# Patient Record
Sex: MALE | Race: WHITE | Employment: FULL TIME | URBAN - METROPOLITAN AREA
[De-identification: names, ages, dates, MRNs, and addresses within clinical notes are randomized per-mention and may not be internally consistent; named-entity substitution may affect disease eponyms.]

---

## 2020-01-28 ENCOUNTER — OFFICE VISIT (OUTPATIENT)
Dept: FAMILY MEDICINE CLINIC | Age: 25
End: 2020-01-28
Payer: COMMERCIAL

## 2020-01-28 VITALS — DIASTOLIC BLOOD PRESSURE: 75 MMHG | HEART RATE: 84 BPM | SYSTOLIC BLOOD PRESSURE: 139 MMHG

## 2020-01-28 PROCEDURE — 99213 OFFICE O/P EST LOW 20 MIN: CPT | Performed by: PHYSICIAN ASSISTANT

## 2020-01-28 RX ORDER — AMOXICILLIN 875 MG/1
875 TABLET, COATED ORAL 2 TIMES DAILY
Qty: 20 TABLET | Refills: 0 | Status: SHIPPED | OUTPATIENT
Start: 2020-01-28 | End: 2020-02-17

## 2020-01-28 RX ORDER — PREDNISONE 20 MG/1
20 TABLET ORAL 2 TIMES DAILY
Qty: 10 TABLET | Refills: 0 | Status: SHIPPED | OUTPATIENT
Start: 2020-01-28 | End: 2020-02-02

## 2020-01-28 ASSESSMENT — ENCOUNTER SYMPTOMS
GASTROINTESTINAL NEGATIVE: 1
SINUS PAIN: 1
RHINORRHEA: 0
COUGH: 1
SHORTNESS OF BREATH: 0
SINUS PRESSURE: 1
EYES NEGATIVE: 1
HOARSE VOICE: 0
SWOLLEN GLANDS: 0
SORE THROAT: 1

## 2020-01-28 NOTE — PROGRESS NOTES
the remainder of the review of systems was reviewed andnegative. PAST MEDICAL HISTORY   History reviewed. No past medical history on file. SURGICAL HISTORY     History reviewed. No past surgical history on file. CURRENT MEDICATIONS       Current Outpatient Medications   Medication Sig Dispense Refill    amoxicillin (AMOXIL) 875 MG tablet Take 1 tablet by mouth 2 times daily 20 tablet 0    predniSONE (DELTASONE) 20 MG tablet Take 1 tablet by mouth 2 times daily for 5 days 10 tablet 0    fluticasone (FLONASE) 50 MCG/ACT nasal spray 1 spray by Nasal route daily 1 Bottle 0    pseudoephedrine (DECONGESTANT) 30 MG tablet Take 1 tablet by mouth every 6 hours as needed for Congestion 40 tablet 0     No current facility-administered medications for this visit. ALLERGIES     Cefdinir    FAMILY HISTORY     No family history on file. No family status information on file. SOCIAL HISTORY      reports that he has never smoked. He has never used smokeless tobacco.      PHYSICAL EXAM    (up to 7 for level 4, 8 or more for level 5)     Vitals:    01/28/20 1017   BP: 139/75   Site: Left Upper Arm   Position: Sitting   Cuff Size: Medium Adult   Pulse: 84         Physical Exam  Vitals signs and nursing note reviewed. Constitutional:       General: He is not in acute distress. Appearance: Normal appearance. He is not ill-appearing, toxic-appearing or diaphoretic. HENT:      Head: Normocephalic and atraumatic. Right Ear: External ear normal.      Left Ear: External ear normal.      Nose: Congestion present. Mouth/Throat:      Mouth: Mucous membranes are moist.   Eyes:      Extraocular Movements: Extraocular movements intact. Conjunctiva/sclera: Conjunctivae normal.   Cardiovascular:      Rate and Rhythm: Normal rate and regular rhythm. Pulmonary:      Effort: Pulmonary effort is normal.      Breath sounds: Normal breath sounds. Abdominal:      Palpations: Abdomen is soft.

## 2020-01-31 ENCOUNTER — OFFICE VISIT (OUTPATIENT)
Dept: FAMILY MEDICINE CLINIC | Age: 25
End: 2020-01-31
Payer: COMMERCIAL

## 2020-01-31 VITALS
TEMPERATURE: 98.3 F | BODY MASS INDEX: 28 KG/M2 | HEART RATE: 62 BPM | RESPIRATION RATE: 16 BRPM | DIASTOLIC BLOOD PRESSURE: 61 MMHG | WEIGHT: 200 LBS | HEIGHT: 71 IN | SYSTOLIC BLOOD PRESSURE: 99 MMHG

## 2020-01-31 PROCEDURE — 99213 OFFICE O/P EST LOW 20 MIN: CPT | Performed by: NURSE PRACTITIONER

## 2020-01-31 RX ORDER — FLUTICASONE PROPIONATE 50 MCG
1 SPRAY, SUSPENSION (ML) NASAL DAILY
Qty: 1 BOTTLE | Refills: 0 | Status: SHIPPED | OUTPATIENT
Start: 2020-01-31 | End: 2020-03-19 | Stop reason: SINTOL

## 2020-01-31 RX ORDER — PSEUDOEPHEDRINE HYDROCHLORIDE 30 MG/1
30 TABLET ORAL EVERY 6 HOURS PRN
Qty: 40 TABLET | Refills: 0 | Status: SHIPPED | OUTPATIENT
Start: 2020-01-31 | End: 2020-02-10

## 2020-01-31 ASSESSMENT — ENCOUNTER SYMPTOMS: ALLERGIC REACTION: 1

## 2020-01-31 NOTE — PROGRESS NOTES
[address] WALK-IN  [facility] [address] [place]  Dept: [phone]  Dept Fax: [phone]    Cornell [place] is a 30 y.o. male who presents today forhis medical conditions/complaints as noted below. Cornell [place] is c/o of   Chief Complaint   Patient presents with    Other     numbness in mouth, neck was swelling      HPI:     Allergic Reaction   This is a new problem. The current episode started today. The problem occurs constantly. Pertinent negatives include no abdominal pain, chest pain, coughing, diarrhea, eye itching, rash or vomiting. Sinusitis   This is a new problem. The current episode started 1 to 4 weeks ago. The problem is unchanged. Associated symptoms include ear pain and a sore throat. Pertinent negatives include no chills, congestion, coughing, headaches, neck pain or shortness of breath. (Ear fullness. Jaw and neck pain ) Past treatments include oral decongestants. The treatment provided mild relief.   sore throat, swollen neck, productive sputum- ABX. Pins and needles feeling on L side. Glands very stiff- have since subsided. History reviewed. No pertinent past medical history. History reviewed. No pertinent surgical history. History reviewed. No pertinent family history. Social History     Tobacco Use    Smoking status: Never Smoker    Smokeless tobacco: Never Used   Substance Use Topics    Alcohol use: Not on file      Current Outpatient Medications   Medication Sig Dispense Refill    fluticasone (FLONASE) 50 MCG/ACT nasal spray 1 spray by Nasal route daily 1 Bottle 0    pseudoephedrine (DECONGESTANT) 30 MG tablet Take 1 tablet by mouth every 6 hours as needed for Congestion 40 tablet 0    amoxicillin (AMOXIL) 875 MG tablet Take 1 tablet by mouth 2 times daily 20 tablet 0     No current facility-administered medications for this visit.          Allergies   Allergen Reactions    Cefdinir Rash Subjective:      Review of Systems   Constitutional: Negative for appetite change, chills, fatigue and fever. HENT: Positive for ear pain, postnasal drip and sore throat. Negative for congestion. L sided neck and jaw pain    Eyes: Negative. Negative for discharge and itching. Respiratory: Negative for cough, chest tightness and shortness of breath. Cardiovascular: Negative for chest pain, palpitations and leg swelling. Gastrointestinal: Negative for abdominal pain, diarrhea, nausea and vomiting. Endocrine: Negative. Genitourinary: Negative for dysuria, frequency and urgency. Musculoskeletal: Negative for neck pain and neck stiffness. Skin: Negative for rash. Allergic/Immunologic: Negative. Neurological: Negative for dizziness, weakness, light-headedness and headaches. Hematological: Negative for adenopathy. Psychiatric/Behavioral: Negative for confusion. Objective:      Physical Exam  Vitals signs reviewed. Constitutional:       Appearance: He is well-developed. HENT:      Head: Normocephalic. Right Ear: External ear normal. A middle ear effusion is present. Left Ear: External ear normal. There is impacted cerumen. Ears:      Comments: Cerumen impaction noted to L ear- patient declines irrigation at this time      Nose: Nose normal.   Eyes:      Conjunctiva/sclera: Conjunctivae normal.      Pupils: Pupils are equal, round, and reactive to light. Neck:      Musculoskeletal: Normal range of motion and neck supple. Cardiovascular:      Rate and Rhythm: Normal rate and regular rhythm. Heart sounds: Normal heart sounds, S1 normal and S2 normal. No murmur. No friction rub. No gallop. Pulmonary:      Effort: Pulmonary effort is normal. No respiratory distress. Breath sounds: Normal breath sounds. No stridor, decreased air movement or transmitted upper airway sounds. No decreased breath sounds, wheezing, rhonchi or rales.    Abdominal: General: Bowel sounds are normal.      Palpations: Abdomen is soft. Musculoskeletal: Normal range of motion. Lymphadenopathy:      Cervical: No cervical adenopathy. Skin:     General: Skin is warm and dry. Findings: No rash. Neurological:      Mental Status: He is alert and oriented to person, place, and time. Cranial Nerves: No cranial nerve deficit. Coordination: Coordination normal.      Deep Tendon Reflexes: Reflexes are normal and symmetric. Psychiatric:         Thought Content: Thought content normal.       BP 99/61   Pulse 62   Temp 98.3 °F (36.8 °C) (Temporal)   Resp 16   Ht 5' 11\" (1.803 m)   Wt 200 lb (90.7 kg)   BMI 27.89 kg/m²     Assessment:       Diagnosis Orders   1. Fluid level behind tympanic membrane of both ears  fluticasone (FLONASE) 50 MCG/ACT nasal spray    pseudoephedrine (DECONGESTANT) 30 MG tablet   2. Impacted cerumen of left ear  fluticasone (FLONASE) 50 MCG/ACT nasal spray    pseudoephedrine (DECONGESTANT) 30 MG tablet             Plan:     1.) Ear irrigation encouraged- declined at this time   2.) Flonase PRN   3.) Decongestant PRN   4.) RTO PRN   5.) Follow-up with PCP     Problem List     None           Patient given educationalmaterials - see patient instructions. Discussed use, benefit, and side effectsof prescribed medications. All patient questions answered. Pt verbalized understanding. Instructed to continue current medications, diet and exercise. Patient agreedwith treatment plan. Follow up as directed.      Electronically signed by COLTON San CNP on 2/5/2020 at 7:07 PM

## 2020-02-05 ASSESSMENT — ENCOUNTER SYMPTOMS
SORE THROAT: 1
COUGH: 0
SHORTNESS OF BREATH: 0
ALLERGIC/IMMUNOLOGIC NEGATIVE: 1
EYE ITCHING: 0
DIARRHEA: 0
EYES NEGATIVE: 1
ABDOMINAL PAIN: 0
VOMITING: 0
CHEST TIGHTNESS: 0
EYE DISCHARGE: 0
NAUSEA: 0

## 2020-02-17 ENCOUNTER — OFFICE VISIT (OUTPATIENT)
Dept: FAMILY MEDICINE CLINIC | Age: 25
End: 2020-02-17
Payer: COMMERCIAL

## 2020-02-17 VITALS
WEIGHT: 196.8 LBS | HEIGHT: 71 IN | OXYGEN SATURATION: 97 % | BODY MASS INDEX: 27.55 KG/M2 | DIASTOLIC BLOOD PRESSURE: 70 MMHG | TEMPERATURE: 97 F | HEART RATE: 97 BPM | SYSTOLIC BLOOD PRESSURE: 118 MMHG

## 2020-02-17 PROBLEM — E66.3 OVERWEIGHT: Status: ACTIVE | Noted: 2020-02-17

## 2020-02-17 PROCEDURE — 99213 OFFICE O/P EST LOW 20 MIN: CPT | Performed by: FAMILY MEDICINE

## 2020-02-17 RX ORDER — OMEPRAZOLE 20 MG/1
20 CAPSULE, DELAYED RELEASE ORAL DAILY
COMMUNITY

## 2020-02-17 ASSESSMENT — ENCOUNTER SYMPTOMS
VOICE CHANGE: 1
SHORTNESS OF BREATH: 0
GASTROINTESTINAL NEGATIVE: 1
COUGH: 1
SINUS PAIN: 1
WHEEZING: 1
TROUBLE SWALLOWING: 0
SORE THROAT: 1
RHINORRHEA: 1
SINUS PRESSURE: 1
EYES NEGATIVE: 1
FACIAL SWELLING: 0

## 2020-02-17 ASSESSMENT — PATIENT HEALTH QUESTIONNAIRE - PHQ9
SUM OF ALL RESPONSES TO PHQ9 QUESTIONS 1 & 2: 0
SUM OF ALL RESPONSES TO PHQ QUESTIONS 1-9: 0
SUM OF ALL RESPONSES TO PHQ QUESTIONS 1-9: 0
2. FEELING DOWN, DEPRESSED OR HOPELESS: 0
1. LITTLE INTEREST OR PLEASURE IN DOING THINGS: 0

## 2020-02-17 NOTE — PROGRESS NOTES
[address] CARE  [address] [place] [address] [place] [id number]  Dept: [phone]  Dept Fax: [phone]    zIabella Joyner is a 30 y.o. male who presents today for his medical conditions/complaints as noted below. Izabella Joyner is c/o of   Chief Complaint   Patient presents with    \[facility]\"" Care    Gastroesophageal Reflux         HPI:     The patient states that he has had GERD. He states 12/2019 he developed a lump in his throat, hacking up phlegm. He denies any heartburn/indigestion. He states he does intermittently have a cough productive of phelgm. The patient states he has been taking omeprazole. The patient does a chronic h/o post nasal drainage. The patient has taken zyrtec in the past but didn't believe it to be working at all. No results found for: LABA1C          ( goal A1Cis < 7)   No results found for: LABMICR  No results found for: LDLCHOLESTEROL, LDLCALC    (goal LDL is <100)   No results found for: AST, ALT, BUN  BP Readings from Last 3 Encounters:   02/17/20 118/70   01/31/20 99/61   01/28/20 139/75          (goal 120/80)    Past Medical History:   Diagnosis Date    Allergic rhinitis     GERD (gastroesophageal reflux disease) 2019      History reviewed. No pertinent surgical history. Family History   Problem Relation Age of Onset    No Known Problems Mother     No Known Problems Father     Psoriasis Sister     Hypertension Maternal Grandmother     Diabetes type 2  Maternal Grandfather     Kidney Disease Maternal Grandfather         passed away at 71years of age   [facility] Stroke Maternal Grandfather     No Known Problems Paternal Grandmother     No Known Problems Paternal Grandfather        Social History     Tobacco Use    Smoking status: Never Smoker    Smokeless tobacco: Never Used   Substance Use Topics    Alcohol use:  Yes     Alcohol/week: 1.0 standard drinks     Types: 1 Cans of beer per week     Comment: 1 beer every 3-5 weeks      Current Outpatient Medications   Medication Sig Dispense Refill    omeprazole (PRILOSEC) 20 MG delayed release capsule Take 20 mg by mouth daily      fluticasone (FLONASE) 50 MCG/ACT nasal spray 1 spray by Nasal route daily 1 Bottle 0     No current facility-administered medications for this visit. Allergies   Allergen Reactions    Cefdinir Rash    Cefzil [Cefprozil] Rash    Rondec-D [Chlophedianol-Pseudoephedrine] Rash       Health Maintenance   Topic Date Due    HIV screen  12/19/2008    Varicella vaccine (1 of 2 - 2-dose childhood series) 03/09/2020 (Originally 12/19/1994)    Flu vaccine (1) 02/17/2021 (Originally 9/1/2019)    DTaP/Tdap/Td vaccine (2 - Td) 07/17/2022    Shingles Vaccine (1 of 2) 12/19/2043    Meningococcal (ACWY) vaccine  Completed    Hepatitis A vaccine  Aged Out    Hepatitis B vaccine  Aged Out    Hib vaccine  Aged Out    HPV vaccine  Aged Out    Pneumococcal 0-64 years Vaccine  Aged Out       Subjective:     Review of Systems   Constitutional: Negative. HENT: Positive for congestion, postnasal drip, rhinorrhea, sinus pressure, sinus pain, sore throat (wakes up with sore throat), tinnitus (occasional) and voice change (notices hoarseness). Negative for dental problem, drooling, ear discharge, ear pain, facial swelling, hearing loss, mouth sores, nosebleeds, sneezing and trouble swallowing. Eyes: Negative. Respiratory: Positive for cough and wheezing (had some wheezing in the past and states he never used an inhaler ). Negative for shortness of breath. Cardiovascular: Negative. Gastrointestinal: Negative. Genitourinary: Negative. Musculoskeletal: Negative. Skin: Negative. Allergic/Immunologic: Negative for environmental allergies, food allergies and immunocompromised state. Neurological: Negative. Psychiatric/Behavioral: Negative. Objective:     Physical Exam  Vitals signs reviewed.    Constitutional:       General: He is 4. Screening for blood disease  CBC Auto Differential             Plan:    Seasonal allergic rhinitis/PND - due to chronic nature, no to minimal improvement on PPI, I will have the patient follow up with ENT to discuss getting allergy testing and also for potential work up. Advised patient to continue flonase and PPI until he sees ENT. Overweight - d/w patient importance of diet and exercise    Cerumen Impaction - patient to use debrox prior to going to see ENT. Also offered patient to return for irrigation after he uses debrox for 2 weeks. Screening -   Labs ordered    Immunizations -   Influenza - patient declines  TdaP - patient UTD per him  No follow-ups on file. Orders Placed This Encounter   Procedures    Lipid, Fasting     Standing Status:   Future     Standing Expiration Date:   2/17/2021    Comprehensive Metabolic Panel, Fasting     Standing Status:   Future     Standing Expiration Date:   2/17/2021    CBC Auto Differential     Standing Status:   Future     Standing Expiration Date:   2/17/2021   Chloe Steward MD, Otolaryngology, [place]     Referral Priority:   Routine     Referral Type:   Eval and Treat     Referral Reason:   Specialty Services Required     Referred to Provider:   Ko Dunn MD     Requested Specialty:   Otolaryngology     Number of Visits Requested:   1     No orders of the defined types were placed in this encounter. Patient given educational materials - see patient instructions. Discussed use, benefit, and side effects of prescribed medications. All patientquestions answered. Pt voiced understanding. Reviewed health maintenance. Instructedto continue current medications, diet and exercise. Patient agreed with treatmentplan. Follow up as directed.      Electronically signed by Carmen Gann MD on 2/17/2020 at 2:25 PM

## 2020-03-19 ENCOUNTER — OFFICE VISIT (OUTPATIENT)
Dept: FAMILY MEDICINE CLINIC | Age: 25
End: 2020-03-19
Payer: COMMERCIAL

## 2020-03-19 VITALS — DIASTOLIC BLOOD PRESSURE: 72 MMHG | HEART RATE: 89 BPM | SYSTOLIC BLOOD PRESSURE: 121 MMHG | TEMPERATURE: 98.6 F

## 2020-03-19 PROCEDURE — 99213 OFFICE O/P EST LOW 20 MIN: CPT | Performed by: NURSE PRACTITIONER

## 2020-03-19 RX ORDER — AMOXICILLIN AND CLAVULANATE POTASSIUM 875; 125 MG/1; MG/1
1 TABLET, FILM COATED ORAL 2 TIMES DAILY
Qty: 20 TABLET | Refills: 0 | Status: SHIPPED | OUTPATIENT
Start: 2020-03-19 | End: 2020-03-29

## 2020-03-19 RX ORDER — AMITRIPTYLINE HYDROCHLORIDE 25 MG/1
25 TABLET, FILM COATED ORAL DAILY
COMMUNITY
Start: 2020-03-03

## 2020-03-19 ASSESSMENT — ENCOUNTER SYMPTOMS
SHORTNESS OF BREATH: 0
SORE THROAT: 0
HOARSE VOICE: 0
SWOLLEN GLANDS: 0
COUGH: 0
SINUS PRESSURE: 1

## 2020-03-19 NOTE — PROGRESS NOTES
[address] WALK-IN  [address] [facility]  Dept: [phone]  Dept Fax: [phone]    Kristine Aktinson is a 30 y.o. male who presents today for his medical conditions/complaints of   Chief Complaint   Patient presents with    Sinusitis     started 3 weeks ago, has had a bloody nose went to the ENT 2 weeks ago Tuesday and the nostril that the scope was put up is the one bleeding. Usually happening before bed today was the first time it happened in the am.           HPI:     /72 (Site: Left Upper Arm, Position: Sitting, Cuff Size: Medium Adult)   Pulse 89   Temp 98.6 °F (37 °C) (Oral)       HPI    Past Medical History:   Diagnosis Date    Allergic rhinitis     GERD (gastroesophageal reflux disease) 2019        No past surgical history on file. Family History   Problem Relation Age of Onset    No Known Problems Mother     No Known Problems Father     Psoriasis Sister     Hypertension Maternal Grandmother     Diabetes type 2  Maternal Grandfather     Kidney Disease Maternal Grandfather         passed away at 71years of age   [facility] Stroke Maternal Grandfather     No Known Problems Paternal Grandmother     No Known Problems Paternal Grandfather        Social History     Tobacco Use    Smoking status: Never Smoker    Smokeless tobacco: Never Used   Substance Use Topics    Alcohol use: Yes     Alcohol/week: 1.0 standard drinks     Types: 1 Cans of beer per week     Comment: 1 beer every 3-5 weeks        Prior to Visit Medications    Medication Sig Taking?  Authorizing Provider   amitriptyline (ELAVIL) 25 MG tablet Take 25 mg by mouth daily Before bed  Historical Provider, MD   omeprazole (PRILOSEC) 20 MG delayed release capsule Take 20 mg by mouth daily  Historical Provider, MD   fluticasone (FLONASE) 50 MCG/ACT nasal spray 1 spray by Nasal route daily  Deidra Robles, APRN - CNP       Allergies   Allergen Reactions    Cefdinir Rash    Cefzil

## 2020-03-19 NOTE — PATIENT INSTRUCTIONS
saline (saltwater) nasal washes to help keep your nasal passages open and wash out mucus and bacteria. You can buy saline nose drops at a grocery store or drugstore. Or you can make your own at home by adding 1 teaspoon of salt and 1 teaspoon of baking soda to 2 cups of distilled water. If you make your own, fill a bulb syringe with the solution, insert the tip into your nostril, and squeeze gently. Severa Linear your nose. · Put a hot, wet towel or a warm gel pack on your face 3 or 4 times a day for 5 to 10 minutes each time. · Try a decongestant nasal spray like oxymetazoline (Afrin). Do not use it for more than 3 days in a row. Using it for more than 3 days can make your congestion worse. When should you call for help? Call your doctor now or seek immediate medical care if:    · You have new or worse swelling or redness in your face or around your eyes.     · You have a new or higher fever.    Watch closely for changes in your health, and be sure to contact your doctor if:    · You have new or worse facial pain.     · The mucus from your nose becomes thicker (like pus) or has new blood in it.     · You are not getting better as expected. Where can you learn more? Go to https://[employer].zealot network. org and sign in to your [employer] account. Enter G283 in the State mental health facility box to learn more about \"Sinusitis: Care Instructions. \"     If you do not have an account, please click on the \"Sign Up Now\" link. Current as of: July 28, 2019Content Version: 12.4  © 3987-3056 Healthwise, Incorporated. Care instructions adapted under license by [address] If you have questions about a medical condition or this instruction, always ask your healthcare professional. [place] [address] any warranty or liability for your use of this information.          Patient Education        Sinusitis: Care Instructions  Your Care Instructions    Sinusitis is an infection of the lining of the sinus cavities in

## 2020-03-19 NOTE — PROGRESS NOTES
[address] WALK-IN  [address] [place]  Dept: [phone]  Dept Fax: [phone]    Shellie Montero is a 30 y.o. male who presents to the urgent care today for his medical conditions/complaints as notedbelow. Shellie Montero is c/o of Sinusitis (started 3 weeks ago, has had a bloody nose went to the ENT 2 weeks ago Tuesday and the nostril that the scope was put up is the one bleeding. Usually happening before bed today was the first time it happened in the am. )      HPI:     22-year-old male patient with a history of allergies and sinus issues presents for sinus pressure and irritation, blood-tinged postnasal drip and mucus. Blowing out yellow. Saw ear nose throat doctor Dr. Gricelda Chatman few weeks ago, nose scoped and told everything looked okay. Taking Flonase. Has had intermittent blood-tinged mucus from same side of nose as scoped ever since. Mostly in the mornings. Positive postnasal drip, in the morning can see some blood-tinged to that also. No shortness of breath or wheezing no history of blood dyscrasias, no easy bleeding or bruising. Amox in January was not effective. Sinusitis   This is a new problem. The current episode started 1 to 4 weeks ago. The problem has been waxing and waning since onset. There has been no fever. The pain is mild. Associated symptoms include congestion and sinus pressure. Pertinent negatives include no chills, coughing, diaphoresis, ear pain, headaches, hoarse voice, neck pain, shortness of breath, sneezing, sore throat or swollen glands. Past treatments include spray decongestants. The treatment provided no relief.        Past Medical History:   Diagnosis Date    Allergic rhinitis     GERD (gastroesophageal reflux disease) 2019        Current Outpatient Medications   Medication Sig Dispense Refill    amoxicillin-clavulanate (AUGMENTIN) 875-125 MG per tablet Take 1 tablet by mouth 2 times daily for 10 days 20 tablet 0    amitriptyline (ELAVIL) 25 MG tablet Take 25 mg by mouth daily Before bed      omeprazole (PRILOSEC) 20 MG delayed release capsule Take 20 mg by mouth daily       No current facility-administered medications for this visit. Allergies   Allergen Reactions    Cefdinir Rash    Cefzil [Cefprozil] Rash    Rondec-D [Chlophedianol-Pseudoephedrine] Rash       Subjective:      Review of Systems   Constitutional: Negative for chills and diaphoresis. HENT: Positive for congestion and sinus pressure. Negative for ear pain, hoarse voice, sneezing and sore throat. Respiratory: Negative for cough and shortness of breath. Musculoskeletal: Negative for neck pain. Neurological: Negative for headaches. All other systems reviewed and are negative. 14 systems reviewed and negative except as listed in HPI. Objective:     Physical Exam  Vitals signs and nursing note reviewed. Constitutional:       General: He is not in acute distress. Appearance: Normal appearance. He is well-developed. He is not ill-appearing, toxic-appearing or diaphoretic. HENT:      Head: Normocephalic and atraumatic. Right Ear: Tympanic membrane and external ear normal.      Left Ear: Tympanic membrane and external ear normal.      Nose: Mucosal edema, congestion and rhinorrhea present. No nasal deformity, septal deviation, signs of injury, laceration or nasal tenderness. Rhinorrhea is purulent. Left Nostril: No foreign body, epistaxis, septal hematoma or occlusion. Right Turbinates: Enlarged and swollen. Left Turbinates: Enlarged and swollen. Left Sinus: Maxillary sinus tenderness present. Comments:  Left maxillary sinus tenderness  No facial redness or swelling  + pharyngeal cobblestoning - thin yellow     Mouth/Throat:      Mouth: Mucous membranes are moist.      Pharynx: No oropharyngeal exudate or posterior oropharyngeal erythema.       Comments: No tongue elevation  Handling oral secretions without difficulty  Eyes:      General: No scleral icterus. Right eye: No discharge. Left eye: No discharge. Conjunctiva/sclera: Conjunctivae normal.      Pupils: Pupils are equal, round, and reactive to light. Neck:      Musculoskeletal: Normal range of motion and neck supple. No neck rigidity. Cardiovascular:      Rate and Rhythm: Normal rate and regular rhythm. Heart sounds: Normal heart sounds. Pulmonary:      Effort: Pulmonary effort is normal. No respiratory distress. Breath sounds: Normal breath sounds. No stridor. No wheezing, rhonchi or rales. Chest:      Chest wall: No tenderness. Abdominal:      General: Bowel sounds are normal. There is no distension. Palpations: Abdomen is soft. Tenderness: There is no abdominal tenderness. Musculoskeletal: Normal range of motion. General: No deformity. Comments: Ambulated to and from room, gait steady, moving all ext without difficulty   Lymphadenopathy:      Cervical: No cervical adenopathy. Skin:     General: Skin is warm and dry. Capillary Refill: Capillary refill takes less than 2 seconds. Findings: No rash ( no rash to visible skin). Neurological:      General: No focal deficit present. Mental Status: He is alert and oriented to person, place, and time. Motor: No abnormal muscle tone. Coordination: Coordination normal.   Psychiatric:         Mood and Affect: Mood normal.         Behavior: Behavior normal.       /72 (Site: Left Upper Arm, Position: Sitting, Cuff Size: Medium Adult)   Pulse 89   Temp 98.6 °F (37 °C) (Oral)     Assessment:       Diagnosis Orders   1.  Acute bacterial sinusitis         Plan:    Based on sx and duration will tx as bacterial sinusitis - left maxillary  No evidence epistaxis  + nasal irritation and inflammation  Stop flonase  Try claritin d instead  Cool mist humidifier to bedside to decrease sinus irritation  neti pot to flush sinuses and decrease irritation  Aug rx  Return for keep appt with ENT in 2 weeks for recheck. Orders Placed This Encounter   Medications    amoxicillin-clavulanate (AUGMENTIN) 875-125 MG per tablet     Sig: Take 1 tablet by mouth 2 times daily for 10 days     Dispense:  20 tablet     Refill:  0         Patient given educational materials - see patient instructions. Discussed use, benefit, and side effects of prescribed medications. All patient questions answered. Pt voicedunderstanding.     Electronically signed by COLTON Farrell CNP on 3/19/2020 at 8:52 AM